# Patient Record
Sex: MALE | Race: BLACK OR AFRICAN AMERICAN | NOT HISPANIC OR LATINO | Employment: FULL TIME | URBAN - METROPOLITAN AREA
[De-identification: names, ages, dates, MRNs, and addresses within clinical notes are randomized per-mention and may not be internally consistent; named-entity substitution may affect disease eponyms.]

---

## 2023-01-26 ENCOUNTER — OFFICE VISIT (OUTPATIENT)
Dept: GASTROENTEROLOGY | Facility: CLINIC | Age: 53
End: 2023-01-26
Payer: COMMERCIAL

## 2023-01-26 VITALS
HEIGHT: 73 IN | DIASTOLIC BLOOD PRESSURE: 98 MMHG | SYSTOLIC BLOOD PRESSURE: 143 MMHG | BODY MASS INDEX: 39.71 KG/M2 | OXYGEN SATURATION: 97 % | TEMPERATURE: 97.5 F | WEIGHT: 299.6 LBS | HEART RATE: 80 BPM

## 2023-01-26 DIAGNOSIS — R11.2 NAUSEA AND VOMITING, UNSPECIFIED VOMITING TYPE: ICD-10-CM

## 2023-01-26 DIAGNOSIS — R11.0 NAUSEA: ICD-10-CM

## 2023-01-26 DIAGNOSIS — R10.9 RIGHT SIDED ABDOMINAL PAIN: Primary | ICD-10-CM

## 2023-01-26 PROCEDURE — 99204 OFFICE O/P NEW MOD 45 MIN: CPT | Performed by: NURSE PRACTITIONER

## 2023-01-26 RX ORDER — AMLODIPINE BESYLATE 10 MG/1
TABLET ORAL
COMMUNITY
Start: 2022-11-03

## 2023-01-26 RX ORDER — NAPROXEN 500 MG/1
TABLET ORAL
COMMUNITY
Start: 2023-01-05

## 2023-01-26 RX ORDER — HYDROCHLOROTHIAZIDE 25 MG/1
25 TABLET ORAL DAILY
COMMUNITY
Start: 2022-11-03

## 2023-01-26 RX ORDER — ATORVASTATIN CALCIUM 40 MG/1
TABLET, FILM COATED ORAL
COMMUNITY
Start: 2022-11-03

## 2023-01-26 RX ORDER — MONTELUKAST SODIUM 10 MG/1
TABLET ORAL DAILY
COMMUNITY

## 2023-01-26 RX ORDER — LOSARTAN POTASSIUM 50 MG/1
TABLET ORAL
COMMUNITY
Start: 2022-11-03

## 2023-01-26 NOTE — Clinical Note
I need a copy of the CT report patient had done about a month ago at [facility] prompting referral to our services for enlarged liver.

## 2023-01-26 NOTE — PROGRESS NOTES
Chief Complaint   Patient presents with   • Hepatomegaly       HPI    Roxann Grayson is a  52 y.o. male here as a new patient referred to our services for hepatomegaly.    This patient will also follow with Dr. Henriquez.    Past medical history of hyperlipidemia, hypertension, and obesity BMI 39.53.    Today he reports approximately 1 month of right-sided abdominal pain.  Pain encompasses the right upper quadrant and the right lower quadrant.  Pain comes and goes described as a sharp aching sensation that can frequently feel stabbing in nature.  Associated nausea with feelings like he could vomit however this happened on 1 occasion.  Denies acid reflux or heartburn.  No dysphagia or odynophagia.  His appetite is good.  His weight is stable.    Denies change in bowel habits.  Bowel movements are regular without issue.  No constipation, diarrhea, or rectal bleeding    Reports negative Cologuard 2 years ago.  He has never had a colonoscopy.    No family history of colon cancer.    Unfortunately his recent CT was not sent over for review.  Patient reports having it done about a month ago at [facility].  We will request a copy for review.    Reviewed labs dated 11/3/2022 with normal liver function test along with normal TSH.    Past Medical History:   Diagnosis Date   • Hyperlipidemia    • Hypertension        No past surgical history on file.    Scheduled Meds:     Continuous Infusions: No current facility-administered medications for this visit.      PRN Meds:     No Known Allergies    Social History     Socioeconomic History   • Marital status: Single   Tobacco Use   • Smoking status: Never   Substance and Sexual Activity   • Alcohol use: Yes     Comment: social   • Drug use: Yes     Frequency: 1.0 times per week     Types: Marijuana       No family history on file.    Review of Systems   Constitutional: Negative for activity change, appetite change, fatigue and unexpected weight change.   HENT: Negative  for trouble swallowing.    Eyes: Negative.    Respiratory: Negative.    Cardiovascular: Negative.    Gastrointestinal: Positive for abdominal pain and nausea. Negative for abdominal distention, anal bleeding, blood in stool, constipation, diarrhea, rectal pain and vomiting.   Endocrine: Negative.    Genitourinary: Negative.    Musculoskeletal: Negative.    Allergic/Immunologic: Negative.    Neurological: Negative.    Hematological: Negative.    Psychiatric/Behavioral: Negative.        Vitals:    01/26/23 1344   BP: 143/98   Pulse: 80   Temp: 97.5 °F (36.4 °C)   SpO2: 97%       Physical Exam  Constitutional:       Appearance: He is well-developed. He is obese.   Abdominal:      General: Bowel sounds are normal. There is no distension.      Palpations: Abdomen is soft. There is no mass.      Tenderness: There is abdominal tenderness in the right upper quadrant and right lower quadrant. There is no guarding.      Hernia: No hernia is present.          Comments: Tenderness noted in the area marked above   Skin:     General: Skin is warm and dry.      Capillary Refill: Capillary refill takes less than 2 seconds.   Neurological:      Mental Status: He is alert and oriented to person, place, and time.   Psychiatric:         Behavior: Behavior normal.       Assessment    Diagnoses and all orders for this visit:    Right sided abdominal pain (Primary)  -     Case Request; Standing  -     Obtain Informed Consent; Standing  -     Verify Bowel Prep Was Successful; Standing  -     Give Tap Water Enema If Bowel Prep Insufficient; Standing  -     Case Request  -     CBC & Differential  -     Comprehensive Metabolic Panel  -     Celiac Comprehensive Panel  -     TSH  -     C-reactive Protein  -     Sedimentation Rate    Nausea and vomiting, unspecified vomiting type  -     Case Request; Standing  -     Obtain Informed Consent; Standing  -     Verify Bowel Prep Was Successful; Standing  -     Give Tap Water Enema If Bowel Prep  Insufficient; Standing  -     Case Request  -     CBC & Differential  -     Comprehensive Metabolic Panel  -     Celiac Comprehensive Panel  -     TSH  -     C-reactive Protein  -     Sedimentation Rate       Plan    Arrange EGD and colonoscopy with Dr. Henriquez for symptoms of right-sided abdominal pain and tenderness on physical exam along with symptoms of nausea and vomiting.  Obtain a copy of imaging done about 1 month ago for reassessment of enlarged liver.  Patient reports imaging was done without contrast may need to consider MRCP to further characterize biliary tree  Labs today as above  Further recommendations and follow-up pending the aforementioned work-up         JC Apodaca  [facility]  [address] [place] [address]  Office: [phone]

## 2023-01-27 LAB
ALBUMIN SERPL-MCNC: 4.4 G/DL (ref 3.5–5.2)
ALBUMIN/GLOB SERPL: 1.3 G/DL
ALP SERPL-CCNC: 135 U/L (ref 39–117)
ALT SERPL-CCNC: 15 U/L (ref 1–41)
AST SERPL-CCNC: 17 U/L (ref 1–40)
BASOPHILS # BLD AUTO: 0.05 10*3/MM3 (ref 0–0.2)
BASOPHILS NFR BLD AUTO: 0.7 % (ref 0–1.5)
BILIRUB SERPL-MCNC: 0.2 MG/DL (ref 0–1.2)
BUN SERPL-MCNC: 13 MG/DL (ref 6–20)
BUN/CREAT SERPL: 16.7 (ref 7–25)
CALCIUM SERPL-MCNC: 9.6 MG/DL (ref 8.6–10.5)
CHLORIDE SERPL-SCNC: 104 MMOL/L (ref 98–107)
CO2 SERPL-SCNC: 26.5 MMOL/L (ref 22–29)
CREAT SERPL-MCNC: 0.78 MG/DL (ref 0.76–1.27)
CRP SERPL-MCNC: 0.52 MG/DL (ref 0–0.5)
EGFRCR SERPLBLD CKD-EPI 2021: 106 ML/MIN/1.73
ENDOMYSIUM IGA SER QL: NEGATIVE
EOSINOPHIL # BLD AUTO: 0.15 10*3/MM3 (ref 0–0.4)
EOSINOPHIL NFR BLD AUTO: 2.1 % (ref 0.3–6.2)
ERYTHROCYTE [DISTWIDTH] IN BLOOD BY AUTOMATED COUNT: 15.5 % (ref 12.3–15.4)
ERYTHROCYTE [SEDIMENTATION RATE] IN BLOOD BY WESTERGREN METHOD: 17 MM/HR (ref 0–20)
GLIADIN PEPTIDE IGA SER-ACNC: 9 UNITS (ref 0–19)
GLIADIN PEPTIDE IGG SER-ACNC: 2 UNITS (ref 0–19)
GLOBULIN SER CALC-MCNC: 3.3 GM/DL
GLUCOSE SERPL-MCNC: 94 MG/DL (ref 65–99)
HCT VFR BLD AUTO: 42.6 % (ref 37.5–51)
HGB BLD-MCNC: 13.9 G/DL (ref 13–17.7)
IGA SERPL-MCNC: 1124 MG/DL (ref 90–386)
IMM GRANULOCYTES # BLD AUTO: 0.02 10*3/MM3 (ref 0–0.05)
IMM GRANULOCYTES NFR BLD AUTO: 0.3 % (ref 0–0.5)
LYMPHOCYTES # BLD AUTO: 2.75 10*3/MM3 (ref 0.7–3.1)
LYMPHOCYTES NFR BLD AUTO: 39.1 % (ref 19.6–45.3)
MCH RBC QN AUTO: 28.4 PG (ref 26.6–33)
MCHC RBC AUTO-ENTMCNC: 32.6 G/DL (ref 31.5–35.7)
MCV RBC AUTO: 86.9 FL (ref 79–97)
MONOCYTES # BLD AUTO: 0.62 10*3/MM3 (ref 0.1–0.9)
MONOCYTES NFR BLD AUTO: 8.8 % (ref 5–12)
NEUTROPHILS # BLD AUTO: 3.44 10*3/MM3 (ref 1.7–7)
NEUTROPHILS NFR BLD AUTO: 49 % (ref 42.7–76)
NRBC BLD AUTO-RTO: 0.1 /100 WBC (ref 0–0.2)
PLATELET # BLD AUTO: 238 10*3/MM3 (ref 140–450)
POTASSIUM SERPL-SCNC: 4.2 MMOL/L (ref 3.5–5.2)
PROT SERPL-MCNC: 7.7 G/DL (ref 6–8.5)
RBC # BLD AUTO: 4.9 10*6/MM3 (ref 4.14–5.8)
SODIUM SERPL-SCNC: 140 MMOL/L (ref 136–145)
TSH SERPL DL<=0.005 MIU/L-ACNC: 1.87 UIU/ML (ref 0.27–4.2)
TTG IGA SER-ACNC: <2 U/ML (ref 0–3)
TTG IGG SER-ACNC: <2 U/ML (ref 0–5)
WBC # BLD AUTO: 7.03 10*3/MM3 (ref 3.4–10.8)

## 2023-01-30 ENCOUNTER — TELEPHONE (OUTPATIENT)
Dept: GASTROENTEROLOGY | Facility: CLINIC | Age: 53
End: 2023-01-30

## 2023-01-30 NOTE — TELEPHONE ENCOUNTER
Caller: Roxann Grayson    Relationship to patient: Self    Best call back number: [phone]    Patient is needing: PT WANTED TO RESCHEDULE SCOPE. PLEASE CALL BACK.

## 2023-01-30 NOTE — TELEPHONE ENCOUNTER
Caller: Roxann Grayson    Relationship to patient: Self    Best call back number: [phone]    Type of visit: COLONOSCOPY    If rescheduling, when is the original appointment: 2/8/23    Additional notes: PT WOULD LIKE TO RESCHEDULE COLONOSCOPY. CALL BACK ANYTIME OK TO M.

## 2023-01-31 ENCOUNTER — TELEPHONE (OUTPATIENT)
Dept: GASTROENTEROLOGY | Facility: CLINIC | Age: 53
End: 2023-01-31

## 2023-01-31 NOTE — TELEPHONE ENCOUNTER
Caller: Roxann Grayson    Relationship to patient: Self    Best call back number: [phone]    Patient is needing: PATIENT MISSED  CALL AND IS REQUESTING CALL BACK., WANTED TO SEE ABOUT HAVING PROCEDURE AT [facility] INSTEAD OF [facility]. PLEASE CALL AFTER 2 PM

## 2023-02-01 ENCOUNTER — TELEPHONE (OUTPATIENT)
Dept: GASTROENTEROLOGY | Facility: CLINIC | Age: 53
End: 2023-02-01
Payer: COMMERCIAL

## 2023-02-01 NOTE — TELEPHONE ENCOUNTER
----- Message from JC Apodaca sent at 1/30/2023 12:26 PM EST -----  I need a copy of the CT report patient had done about a month ago at [facility] prompting referral to our services for enlarged liver.

## 2023-02-08 ENCOUNTER — TELEPHONE (OUTPATIENT)
Dept: GASTROENTEROLOGY | Facility: CLINIC | Age: 53
End: 2023-02-08
Payer: COMMERCIAL

## 2023-02-08 ENCOUNTER — TELEPHONE (OUTPATIENT)
Dept: GASTROENTEROLOGY | Facility: CLINIC | Age: 53
End: 2023-02-08

## 2023-02-08 NOTE — TELEPHONE ENCOUNTER
Caller: Roxann Grayson    Relationship: Self    Best call back number: [phone]    What is the best time to reach you: AFTER 2PM    Who are you requesting to speak with (clinical staff, provider,  specific staff member): CLINICAL    Do you know the name of the person who called: HE WAS UNSURE AND THEY DIDN'T LEAVE A MESSAGE, JUST SAW A MISSED CALL THAHE IS RETURNING.    Do you require a callback: YES

## 2023-02-08 NOTE — TELEPHONE ENCOUNTER
Called pt, advised of AUGUSTINA Fernandez's note on 1/30.      Pt states he cannot afford scopes at this time, his OOP would be $1500.      Called [facility] and left  requesting CT or MRI of liver or abdomen.

## 2023-02-08 NOTE — TELEPHONE ENCOUNTER
----- Message from JC Apodaca sent at 1/30/2023 12:28 PM EST -----  Please inform the patient that lab work shows no evidence of celiac disease.  One of his liver function test is slightly elevated.  Still waiting on copy of recent CT see CC chart message.    Normal thyroid function.  No anemia.  Await endoscopic exam.

## 2023-02-08 NOTE — TELEPHONE ENCOUNTER
Patient called. No answer. Left VM as per MAG. Advised as per Rebecca's note. Advised to call back with questions.     CT scan of chest from [facility] received and scanned into patient's chart.     Update to Rebecca.

## 2023-02-10 ENCOUNTER — TELEPHONE (OUTPATIENT)
Dept: GASTROENTEROLOGY | Facility: CLINIC | Age: 53
End: 2023-02-10
Payer: COMMERCIAL

## 2023-02-10 DIAGNOSIS — R10.9 RIGHT SIDED ABDOMINAL PAIN: Primary | ICD-10-CM

## 2023-02-10 NOTE — TELEPHONE ENCOUNTER
Patient called. No answer. Left VM advising as per Rebecca's note.   Advised to call for questions.

## 2023-02-27 ENCOUNTER — HOSPITAL ENCOUNTER (OUTPATIENT)
Dept: ULTRASOUND IMAGING | Facility: HOSPITAL | Age: 53
End: 2023-02-27
Payer: COMMERCIAL

## 2023-04-13 ENCOUNTER — TELEPHONE (OUTPATIENT)
Dept: GASTROENTEROLOGY | Facility: CLINIC | Age: 53
End: 2023-04-13

## 2023-04-13 ENCOUNTER — TELEPHONE (OUTPATIENT)
Dept: GASTROENTEROLOGY | Facility: CLINIC | Age: 53
End: 2023-04-13
Payer: COMMERCIAL

## 2023-04-13 NOTE — TELEPHONE ENCOUNTER
Caller: Roxann Grayson    Relationship: Self    Best call back number: [phone]    What is the best time to reach you: ANYTIME AFTER 3:30PM. OKAY TO LEAVE VM    What was the call regarding: PT RETURNING CALL TO SCHEDULE SCOPE.    Do you require a callback: YES